# Patient Record
Sex: MALE | URBAN - METROPOLITAN AREA
[De-identification: names, ages, dates, MRNs, and addresses within clinical notes are randomized per-mention and may not be internally consistent; named-entity substitution may affect disease eponyms.]

---

## 2022-04-05 ENCOUNTER — OFFICE VISIT (OUTPATIENT)
Dept: URBAN - METROPOLITAN AREA CLINIC 71 | Facility: CLINIC | Age: 77
End: 2022-04-05
Payer: COMMERCIAL

## 2022-04-05 DIAGNOSIS — H25.813 COMBINED FORMS OF AGE-RELATED CATARACT, BILATERAL: Primary | ICD-10-CM

## 2022-04-05 DIAGNOSIS — H52.4 PRESBYOPIA: ICD-10-CM

## 2022-04-05 DIAGNOSIS — H43.22 CRYSTALLINE DEPOSITS IN VITREOUS BODY, LEFT EYE: ICD-10-CM

## 2022-04-05 PROCEDURE — 99204 OFFICE O/P NEW MOD 45 MIN: CPT | Performed by: OPTOMETRIST

## 2022-04-05 ASSESSMENT — KERATOMETRY
OS: 43.63
OD: 43.50

## 2022-04-05 ASSESSMENT — INTRAOCULAR PRESSURE
OS: 13
OD: 17

## 2022-04-05 ASSESSMENT — VISUAL ACUITY
OD: 20/25
OS: 20/20

## 2022-04-05 NOTE — IMPRESSION/PLAN
Impression: Combined forms of age-related cataract, bilateral: H25.813. Plan: Cataracts are stable and PT reports his vision does not bother him at this time. No treatment currently recommended. The patient will monitor vision changes and contact us with any decrease in vision.

## 2022-04-05 NOTE — IMPRESSION/PLAN
Impression: Presbyopia: H52.4. Plan: Presbyopia is the inability to focus on objects (ie: accommodate) due to the loss of flexibility of your natural lens. Presbyopia occurs with age. Reading glasses, bifocals, trifocals or contacts can be helpful. Contact the office if difficulty focusing persists despite corrective eye wear. New glasses RX given today for flattop bifocals.

## 2022-04-05 NOTE — IMPRESSION/PLAN
Impression: Crystalline deposits in vitreous body, left eye: H43.22. 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 This usually does not affect vision, but in the rare situation in which the vision declines, I would recommend a fluorescein angiogram which can visualize the retina through the asteroid bodies. We will continue to monitor for now.

## 2023-01-31 ENCOUNTER — OFFICE VISIT (OUTPATIENT)
Dept: URBAN - METROPOLITAN AREA CLINIC 71 | Facility: CLINIC | Age: 78
End: 2023-01-31
Payer: COMMERCIAL

## 2023-01-31 DIAGNOSIS — H52.4 PRESBYOPIA: ICD-10-CM

## 2023-01-31 DIAGNOSIS — H43.22 CRYSTALLINE DEPOSITS IN VITREOUS BODY, LEFT EYE: ICD-10-CM

## 2023-01-31 DIAGNOSIS — H25.813 COMBINED FORMS OF AGE-RELATED CATARACT, BILATERAL: Primary | ICD-10-CM

## 2023-01-31 DIAGNOSIS — H35.033 HYPERTENSIVE RETINOPATHY, BILATERAL: ICD-10-CM

## 2023-01-31 DIAGNOSIS — H35.351 CYSTOID MACULAR DEGENERATION, RIGHT EYE: ICD-10-CM

## 2023-01-31 DIAGNOSIS — E11.9 TYPE 2 DIABETES MELLITUS W/O COMPLICATION: ICD-10-CM

## 2023-01-31 PROCEDURE — 92014 COMPRE OPH EXAM EST PT 1/>: CPT

## 2023-01-31 RX ORDER — CARBOXYMETHYLCELLULOSE SODIUM 0.25 %
0.25 % DROPS OPHTHALMIC (EYE)
Qty: 5 | Refills: 2 | Status: ACTIVE
Start: 2023-01-31

## 2023-01-31 ASSESSMENT — INTRAOCULAR PRESSURE
OD: 12
OS: 11

## 2023-01-31 NOTE — IMPRESSION/PLAN
Impression: Crystalline deposits in vitreous body, left eye: H43.22. Plan: Will continue to monitor for now. Recommend yearly dilated exams.

## 2023-01-31 NOTE — IMPRESSION/PLAN
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 Per Optos performed today. Plan: Continue to monitor.

## 2023-01-31 NOTE — IMPRESSION/PLAN
Impression: Hypertensive retinopathy, bilateral: H35.033. Plan: Discussed importance of good blood pressure control and monitoring. Continue to observe.

## 2023-01-31 NOTE — IMPRESSION/PLAN
Impression: Combined forms of age-related cataract, bilateral: H25.813. Plan: Cataracts are stable and PT reports his vision does not bother him at this time. Still no treatment currently recommended. The patient will monitor vision changes and contact us with any decrease in vision.

## 2023-01-31 NOTE — IMPRESSION/PLAN
Impression: Type 2 diabetes mellitus w/o complication: E35.7. Plan: Patient educated on today's findings. Patient to continue monitoring blood sugar regularly, taking medications as directed and having regular follow-up appts w/ PCP. Continue to monitor w/ yearly DFE and Optos.